# Patient Record
Sex: FEMALE | Race: OTHER | URBAN - METROPOLITAN AREA
[De-identification: names, ages, dates, MRNs, and addresses within clinical notes are randomized per-mention and may not be internally consistent; named-entity substitution may affect disease eponyms.]

---

## 2018-08-15 ENCOUNTER — HOSPITAL ENCOUNTER (OUTPATIENT)
Dept: LAB | Age: 33
Discharge: HOME | End: 2018-08-15

## 2018-08-15 ENCOUNTER — OFFICE VISIT (OUTPATIENT)
Dept: FAMILY MEDICINE CLINIC | Age: 33
End: 2018-08-15

## 2018-08-15 VITALS
TEMPERATURE: 98.2 F | DIASTOLIC BLOOD PRESSURE: 76 MMHG | SYSTOLIC BLOOD PRESSURE: 130 MMHG | HEIGHT: 61 IN | WEIGHT: 154.8 LBS | BODY MASS INDEX: 29.23 KG/M2 | HEART RATE: 76 BPM

## 2018-08-15 DIAGNOSIS — N30.00 ACUTE CYSTITIS WITHOUT HEMATURIA: ICD-10-CM

## 2018-08-15 DIAGNOSIS — G43.709 CHRONIC MIGRAINE WITHOUT AURA WITHOUT STATUS MIGRAINOSUS, NOT INTRACTABLE: Primary | ICD-10-CM

## 2018-08-15 LAB
BILIRUB UR QL STRIP: NEGATIVE
GLUCOSE UR-MCNC: NEGATIVE MG/DL
KETONES P FAST UR STRIP-MCNC: ABNORMAL MG/DL
PH UR STRIP: 8 [PH] (ref 4.6–8)
PROT UR QL STRIP: ABNORMAL
SP GR UR STRIP: 1 (ref 1–1.03)
UA UROBILINOGEN AMB POC: NORMAL (ref 0.2–1)
URINALYSIS CLARITY POC: CLEAR
URINALYSIS COLOR POC: YELLOW
URINE BLOOD POC: NEGATIVE
URINE LEUKOCYTES POC: ABNORMAL
URINE NITRITES POC: NEGATIVE

## 2018-08-15 PROCEDURE — 87086 URINE CULTURE/COLONY COUNT: CPT | Performed by: FAMILY MEDICINE

## 2018-08-15 RX ORDER — CIPROFLOXACIN 500 MG/1
500 TABLET ORAL 2 TIMES DAILY
Qty: 10 TAB | Refills: 0 | Status: SHIPPED | OUTPATIENT
Start: 2018-08-15 | End: 2018-08-20

## 2018-08-15 RX ORDER — TOPIRAMATE 25 MG/1
25 TABLET ORAL 2 TIMES DAILY WITH MEALS
Qty: 60 TAB | Refills: 5 | Status: SHIPPED | OUTPATIENT
Start: 2018-08-15

## 2018-08-15 NOTE — PROGRESS NOTES
Results for orders placed or performed in visit on 08/15/18   AMB POC URINALYSIS DIP STICK MANUAL W/O MICRO   Result Value Ref Range    Color (UA POC) Yellow     Clarity (UA POC) Clear     Glucose (UA POC) Negative Negative    Bilirubin (UA POC) Negative Negative    Ketones (UA POC) 1+ Negative    Specific gravity (UA POC) 1.000 (A) 1.001 - 1.035    Blood (UA POC) Negative Negative    pH (UA POC) 8.0 4.6 - 8.0    Protein (UA POC) Trace Negative    Urobilinogen (UA POC) normal 0.2 - 1    Nitrites (UA POC) Negative Negative    Leukocyte esterase (UA POC) 2+ Negative

## 2018-08-15 NOTE — PATIENT INSTRUCTIONS
Migrañas: Instrucciones de cuidado - [ Migraine Headache: Care Instructions ]  Instrucciones de [address] son maria e de trisha intensos y pulsantes que suelen comenzar en un lado de Abdulaziz Brownith. Podrían causar náuseas y vómito, y sensibilidad a la vicky, los sonidos o los olores. Si la migraña no es tratada, puede durar de 4 horas a varios días. Los medicamentos pueden ayudar a prevenir la migraña o detenerla después de que ha empezado. Bartlett médico puede ayudarle a encontrar cuáles funcionan mejor para usted. La atención de seguimiento es abby parte clave de bartlett tratamiento y seguridad. Asegúrese de hacer y acudir a todas las citas, y llame a bartlett médico si está teniendo problemas. También es abby buena idea saber los resultados de yogi exámenes y mantener abby lista de los medicamentos que rhiannon. ¿Cómo puede cuidarse en el hogar? · No conduzca si ha tomado analgésicos (medicamentos para el dolor) recetados. · Descanse en un cuarto tranquilo y oscuro hasta que desaparezca el dolor de Christina Tobi. Cierre los ojos y trate de relajarse o dormirse. No jose armando la televisión ni alex. · Colóquese un paño frío y húmedo o [facility] compresa fría sobre la shari adolorida herson 10 a 21 minutos cada vez. Póngase un paño [place] entre la compresa fría y la piel. · Utilice abby toalla húmeda tibia o abby almohadilla térmica ajustada a baja temperatura para relajar los músculos tensos del nagi y los hombros.  · Pídale a alguien que le xander masajes suaves en el nagi y los hombros.  · [place] yogi medicamentos exactamente burt le fueron recetados. Llame a bartlett médico si tania que está teniendo un problema con bartlett medicamento. Recibirá [employer] medicamentos específicos recetados por bartlett médico.  · Tenga cuidado de no valentín analgésicos con mayor frecuencia que la permitida en las indicaciones. Los maria e de trisha podrían empeorar o aparecer con mayor frecuencia abby vez que el medicamento pierda bartlett [place].   [address] migrañas  · Lleve un diario de yogi maria e de [place] para que pueda averiguar qué los desencadena. Evitar los desencadenantes podría ayudar a prevenir los maria e de [place]. [place] cuándo empezó cada dolor de trisha, cuánto duró y cómo fue el dolor. (¿Fue palpitante, rafaela, punzante o sordo?) [place] cualquier otro síntoma que haya tenido con el dolor de [place], [place] náuseas, destellos de vicky o JEANETTE, o sensibilidad a la vicky brillante o a los ruidos chin. [place] si el dolor de trisha ocurrió cerca de bartlett menstruación. Enumere todos los factores que pudieran stephanie desencadenado el dolor de [place]. Los desencadenantes pueden incluir ciertos alimentos (chocolate, queso, vino) u olores, humo, luces brillantes, estrés o falta de sueño. · Si bartlett médico le recetó medicamentos para las migrañas, tómelos según las indicaciones. Podría tener medicamentos que rhiannon solo cuando le da abby migraña y medicamentos que rhiannon todo el tiempo para prevenirlas. ¨ Si bartlett médico le recetó un medicamento para cuando tenga dolor de trisha, tómelo al primer indicio de migraña, a menos que bartlett médico le haya dado otras indicaciones. ¨ Si bartlett médico le ha recetado medicamentos para prevenir las migrañas, tómelos exactamente según las indicaciones. Llame a bartlett médico si tania que está teniendo un problema con bartlett medicamento. · Encuentre maneras saludables de [facility]. Mason Ridgel son más comunes herson o herminia después de un momento estresante. Tómese un tiempo para relajarse antes y después de hacer algo que le haya causado abby migraña en el pasado. · Trate de mantener yogi músculos relajados mediante abby buena postura. Revise si tiene [employer] de la Jenna, la irena, el nagi y los hombros. Trate de [place]. Cuando esté sentado en un escritorio, cambie de posición con frecuencia. Y asegúrese de que se estira por 30 segundos cada hora. · Vikash suficiente ejercicio y duerma bastante.   · Coma las comidas según un horario regular. Evite los alimentos y las bebidas que suelen desencadenar las migrañas. Entre éstos se incluyen el chocolate, las bebidas alcohólicas (particularmente el vino tinto y el oporto), el aspartamo, el glutamato monosódico (MSG, por yogi siglas en inglés) y algunos aditivos que se encuentran en los alimentos (burt, por ejemplo, los perros calientes [\"hot dogs\"], el tocino, los [place], los quesos añejos y los alimentos en conservas). · Limite la cafeína. No bailey demasiado café, té ni sodas. Bea no deje de consumir cafeína de repente. Eso también puede provocarle migrañas. · No fume ni permita que otros fumen a bartlett alrededor. Si necesita ayuda para dejar de fumar, hable con bartlett médico sobre programas y medicamentos para dejar de fumar. Estos pueden aumentar yogi probabilidades de dejar el hábito para siempre. · Si rhiannon píldoras anticonceptivas o terapia hormonal, hable con bartlett médico para kristi si eso está desencadenando yogi migrañas. ¿Cuándo debe pedir ayuda? Llame al 911 en cualquier momento que piense que puede necesitar atención de [place]. Por ejemplo, llame si:    · Tiene señales de un ataque cerebral. Estas pueden incluir:  ¨ Entumecimiento, parálisis o debilidad repentinos en la irena, el brazo o la pierna, sobre todo si ocurre en un solo lado del cuerpo. ¨ Cambios repentinos en la visión. ¨ Dificultades repentinas para hablar. ¨ Confusión repentina o dificultad para comprender frases sencillas. ¨ Problemas repentinos para caminar o mantener el equilibrio.   ¨ Dolor de [place] intenso y repentino, distinto de los maria e de trisha anteriores.    Llame a bartlett médico ahora mismo o busque atención médica inmediata si:    · Tiene náuseas y vómito nuevos o empeoran los que ya tenía.     · Tiene fiebre nueva o más binta.     · Bartlett dolor de trisha empeora mucho.    Preste especial atención a los cambios en bartlett damien y asegúrese de comunicarse con bartlett médico si:    · No mejora después de 2 días (50 horas). ¿Dónde puede encontrar más información en inglés? Soha Lyric a http://damaris-jono.info/. Lisa Barber [id number] en la búsqueda para aprender Joan Spatz de \"Migrañas: Instrucciones de cuidado - [ Migraine Headache: Care Instructions ]. \"  Revisado: 9 octubre, 2017  Versión del contenido: 11.7  © 7676-4465 Healthwise, Incorporated. Las instrucciones de cuidado fueron adaptadas bajo licencia por [employer] (which disclaims liability or warranty for this information). Si usted tiene [place] afección médica o sobre estas instrucciones, siempre pregunte a bartlett profesional de damien. Healthwise, Incorporated niega toda garantía o responsabilidad por bartlett uso de esta información. Infección urinaria en las mujeres: Instrucciones de cuidado - [ Urinary Tract Infection in Women: Care Instructions ]  Instrucciones de cuidado    Abby infección urinaria (UTI, por yogi siglas en inglés) es un término general que hace referencia a abby infección que se produce en cualquier parte entre los riñones y la uretra (conducto por el cual se expulsa la orina). La mayoría de las UTI son infecciones de la vejiga. Con frecuencia, causan dolor o ardor al Ted. Las UTI son causadas por bacterias y pueden curarse con antibióticos. Asegúrese de completar el tratamiento para que la infección desaparezca. La atención de seguimiento es abby parte clave de bartlett tratamiento y seguridad. Asegúrese de hacer y acudir a todas las citas, y llame a bartlett médico si está teniendo problemas. También es abby buena idea saber los resultados de yogi exámenes y mantener abby lista de los medicamentos que rhiannon. ¿Cómo puede cuidarse en el hogar? · [address] No deje de tomarlos por el hecho de sentirse mejor. Debe valentín todos los antibióticos hasta terminarlos. · Sheryl los próximos vipin o [address], bailey mayor cantidad de [place] y otros líquidos.  [place] puede ayudar a eliminar las bacterias que provocan la infección. (Si tiene abby enfermedad de los riñones, el corazón o el hígado y tiene que [place] líquidos, hable con bartlett médico antes de aumentar bartlett consumo). · Evite las bebidas gaseosas o con cafeína. Pueden irritar la vejiga. · Orine con frecuencia. Trate de vaciar la vejiga cada vez que orine. · Para aliviar el dolor, tome un baño caliente o colóquese abby almohadilla térmica a baja temperatura sobre la parte baja del abdomen o la shari genital. Nunca se duerma mientras [place] abby almohadilla térmica. Para prevenir las infecciones urinarias  · Altagracia abundante agua todos los días. [place] la ayuda a orinar con frecuencia, lo que elimina las bacterias de bartlett organismo. (Si tiene abby enfermedad de los riñones, el corazón o el hígado y tiene que [place] líquidos, hable con bartlett médico antes de aumentar bartlett consumo). · Orine cuando necesite hacerlo. · Orine inmediatamente después de stephanie tenido [employer]. · Cámbiese las toallas sanitarias con frecuencia. · Evite el uso de lavados vaginales, los young de burbujas, los [place] de higiene femenina y otros productos para la higiene femenina que contengan desodorantes. · Después de ir al baño, límpiese de adelante hacia atrás. ¿Cuándo debes pedir ayuda? Llama a tu médico ahora mismo o busca atención médica inmediata si:    · Aparecen síntomas burt fiebre, escalofríos, náuseas o vómitos por primera vez, o empeoran.     · Te empieza a doler la espalda, herminia debajo de la caja torácica. A esto se le llama dolor en el flanco.     · Aparece angel o pus en la orina.     · Tienes problemas con los antibióticos.    Presta especial atención a los cambios en tu damien y asegúrate de comunicarte con tu médico si:    · No mejoras después de stephanie tomado un antibiótico herson 2 días.     · Los síntomas desaparecen y Leia Warren. ¿Dónde puede encontrar más información en inglés? [place] a http://damaris-jono.info/.   Andrew [id number] en la búsqueda para aprender más acerca de \"Infección urinaria en las mujeres: Instrucciones de cuidado - [ Urinary Tract Infection in Women: Care Instructions ]. \"  Revisado: 12 agrawal, 2017  Versión del contenido: 11.7  © 9624-7905 Healthwise, Incorporated. Las instrucciones de cuidado fueron adaptadas bajo licencia por [employer] (which disclaims liability or warranty for this information). Si usted tiene [place] afección médica o sobre estas instrucciones, siempre pregunte a bartlett profesional de damien. Healthwise, Incorporated niega toda garantía o responsabilidad por bartlett uso de esta información.

## 2018-08-15 NOTE — PROGRESS NOTES
HISTORY OF PRESENT ILLNESS  Coral Leblanc is a 34 y.o. female. HPI  Has been having headache for years. Has been taking tylenol. States she has headaches everyday. 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 Had been having this problems since she lived in [place] and was diagnosed with migraines. Has been having some problems to urinate. Burning sensation, urinary frequency, suprapubic pain. Present for at least a month. Review of Systems   Constitutional: Negative for chills, fever and malaise/fatigue. HENT: Negative for congestion, hearing loss, nosebleeds, sinus pain and sore throat. Eyes: Negative for blurred vision, double vision and photophobia. Respiratory: Negative for cough, hemoptysis and sputum production. Cardiovascular: Negative for chest pain, palpitations and orthopnea. Gastrointestinal: Negative for heartburn, nausea and vomiting. Genitourinary: Positive for dysuria and frequency. Suprapubic pain   Musculoskeletal: Negative for back pain, myalgias and neck pain. Skin: Negative for itching and rash. Neurological: Positive for headaches. Negative for dizziness, tingling, tremors, sensory change, loss of consciousness and weakness. Psychiatric/Behavioral: Negative for depression, hallucinations, memory loss, substance abuse and suicidal ideas. The patient is not nervous/anxious and does not have insomnia. No past medical history on file. No past surgical history on file. Social History     Social History    Marital status:      Spouse name: N/A    Number of children: N/A    Years of education: N/A     Social History Main Topics    Smoking status: Never Smoker    Smokeless tobacco: Never Used    Alcohol use No    Drug use: No    Sexual activity: Not Asked     Other Topics Concern    None     Social History Narrative    None       No family history on file.     Current Outpatient Prescriptions   Medication Sig Dispense Refill    topiramate (TOPAMAX) 25 mg tablet Take 1 Tab by mouth two (2) times daily (with meals). Indications: MIGRAINE PREVENTION 60 Tab 5    ciprofloxacin HCl (CIPRO) 500 mg tablet Take 1 Tab by mouth two (2) times a day for 5 days. 10 Tab 0       No Known Allergies  /76 (BP 1 Location: Right arm)  Pulse 76  Temp 98.2 °F (36.8 °C) (Oral)   Ht 5' 1.02\" (1.55 m)  Wt 154 lb 12.8 oz (70.2 kg)  LMP 07/28/2018  BMI 29.23 kg/m2  Physical Exam   Constitutional: She appears well-developed and well-nourished. HENT:   Head: Normocephalic. Right Ear: External ear normal.   Left Ear: External ear normal.   Mouth/Throat: No oropharyngeal exudate. Eyes: Conjunctivae and EOM are normal. Pupils are equal, round, and reactive to light. Neck: Normal range of motion. Neck supple. No thyromegaly present. Cardiovascular: Normal rate, regular rhythm and normal heart sounds. Exam reveals no friction rub. No murmur heard. Pulmonary/Chest: Effort normal and breath sounds normal. She has no wheezes. She has no rales. She exhibits no tenderness. Abdominal: Soft. Bowel sounds are normal. She exhibits no distension and no mass. There is tenderness. There is no rebound. Suprapubic tenderness   Musculoskeletal: Normal range of motion. She exhibits no edema, tenderness or deformity. Lymphadenopathy:     She has no cervical adenopathy. Skin: Skin is warm. No rash noted. ASSESSMENT and PLAN  Diagnoses and all orders for this visit:    1. Chronic migraine without aura without status migrainosus, not intractable  -     AMB POC URINALYSIS DIP STICK MANUAL W/O MICRO  -     topiramate (TOPAMAX) 25 mg tablet; Take 1 Tab by mouth two (2) times daily (with meals). Indications: MIGRAINE PREVENTION    2. Acute cystitis without hematuria  -     CULTURE, URINE; Future  -     ciprofloxacin HCl (CIPRO) 500 mg tablet; Take 1 Tab by mouth two (2) times a day for 5 days. will start prophylaxis for migraines.   Possible side effects of medications discussed

## 2018-08-16 LAB
BACTERIA SPEC CULT: NORMAL
CC UR VC: NORMAL
SERVICE CMNT-IMP: NORMAL

## 2018-08-20 ENCOUNTER — TELEPHONE (OUTPATIENT)
Dept: FAMILY MEDICINE CLINIC | Age: 33
End: 2018-08-20

## 2018-08-20 NOTE — TELEPHONE ENCOUNTER
Patient informed about recent lab result message from Dr Iva Dailey. This has been fully explained to the patient, who indicates understanding.